# Patient Record
Sex: FEMALE | Race: OTHER | URBAN - NONMETROPOLITAN AREA
[De-identification: names, ages, dates, MRNs, and addresses within clinical notes are randomized per-mention and may not be internally consistent; named-entity substitution may affect disease eponyms.]

---

## 2021-02-03 NOTE — PATIENT DISCUSSION
"""S/P IOL OS: Tecnis ZCB00 19.5 (ORA) +ORA/Femto/Arcs +Omidria. Continue post operative instructions and drops per schedule.  """

## 2021-05-19 NOTE — PATIENT DISCUSSION
S/p CE/PCIOL. Patient responding well to the current drop therapy. Taper Prednisolone Acetate 2x a day for 1 week, then 1x  day for 1 week then STOP.

## 2021-06-07 NOTE — PATIENT DISCUSSION
Patient will call to schedule Biometry for OD. Had testing January 12, 2021, explained only good for 6 months. Patient understood that she would need to schedule for scans once decided if she would like to follow through with cataract surgery OD.

## 2021-10-21 NOTE — PATIENT DISCUSSION
Surgical Coordinator Notes: OD only, OCT Done today 10/21/2021. Dilate at Preop due to not being dilated over 6 months.

## 2021-11-17 NOTE — PATIENT DISCUSSION
CATARACT SURGERY PLANNER - STANDARD IOL/+FEMTO: Phacoemulsification with IOL: Eye: OD|DOS: 12/2/2021|Model: ZCBOO|Power: 20. 0|Target: PLANO|Femto: YES|Arcs: 46° @ 20° ; 46° @ 200°|Visc: DUET|Omidria: YES|10% Phenylephrine: YES|Epi-shugarcaine: YES|Phaco Setting: STD|BSS+: NO|Trypan Blue: NO|CTR: NO|Olive Tip: NO|Atropine: NO|Pupilloplasty: NO|Notes: PLAN: ZCBOO W/ FEMTO @ PLANO OD. HX: SECONDARY NONINFECTIOUS IRIDOCYLITIS S/P CE OS; SUBTLE RPE CHANGES OU; RARE MICRODRUSEN. DILATED PUPIL: NOT LISTED.

## 2023-04-10 ENCOUNTER — NEW PATIENT (OUTPATIENT)
Dept: URBAN - NONMETROPOLITAN AREA CLINIC 6 | Facility: CLINIC | Age: 58
End: 2023-04-10

## 2023-04-10 DIAGNOSIS — H25.13: ICD-10-CM

## 2023-04-10 PROCEDURE — 92004 COMPRE OPH EXAM NEW PT 1/>: CPT

## 2023-04-10 PROCEDURE — 92015 DETERMINE REFRACTIVE STATE: CPT

## 2023-04-10 ASSESSMENT — VISUAL ACUITY
OD_GLARE: 20/200
OD_SC: 20/60+1
OS_SC: 20/25-1
OS_GLARE: 20/25
OU_SC: 20/25-1

## 2023-04-10 ASSESSMENT — TONOMETRY
OS_IOP_MMHG: 14
OD_IOP_MMHG: 15

## 2023-04-10 ASSESSMENT — KERATOMETRY
OD_AXISANGLE2_DEGREES: 94
OS_AXISANGLE_DEGREES: 180
OD_K2POWER_DIOPTERS: 43.25
OD_AXISANGLE_DEGREES: 4
OS_K2POWER_DIOPTERS: 43.75
OS_AXISANGLE2_DEGREES: 90
OD_K1POWER_DIOPTERS: 42.00
OS_K1POWER_DIOPTERS: 42.50

## 2023-05-10 ENCOUNTER — PRE-OP/H&P (OUTPATIENT)
Dept: URBAN - NONMETROPOLITAN AREA CLINIC 6 | Facility: CLINIC | Age: 58
End: 2023-05-10

## 2023-05-10 DIAGNOSIS — H25.13: ICD-10-CM

## 2023-05-10 PROCEDURE — 92136 OPHTHALMIC BIOMETRY: CPT

## 2023-05-10 ASSESSMENT — KERATOMETRY
OS_K2POWER_DIOPTERS: 43.75
OD_K2POWER_DIOPTERS: 43.25
OS_K1POWER_DIOPTERS: 42.50
OS_AXISANGLE_DEGREES: 180
OD_K1POWER_DIOPTERS: 42.00
OD_AXISANGLE2_DEGREES: 94
OD_AXISANGLE_DEGREES: 4
OS_AXISANGLE2_DEGREES: 90

## 2023-05-31 PROBLEM — Z96.1: Noted: 2023-05-31

## 2023-06-01 ENCOUNTER — POST-OP (OUTPATIENT)
Dept: URBAN - NONMETROPOLITAN AREA CLINIC 6 | Facility: CLINIC | Age: 58
End: 2023-06-01

## 2023-06-01 DIAGNOSIS — Z96.1: ICD-10-CM

## 2023-06-01 PROCEDURE — 99024 POSTOP FOLLOW-UP VISIT: CPT

## 2023-06-01 ASSESSMENT — VISUAL ACUITY: OD_SC: 20/40

## 2023-06-01 ASSESSMENT — TONOMETRY: OD_IOP_MMHG: 8

## 2023-06-05 ENCOUNTER — POST-OP (OUTPATIENT)
Dept: URBAN - NONMETROPOLITAN AREA CLINIC 6 | Facility: CLINIC | Age: 58
End: 2023-06-05

## 2023-06-05 DIAGNOSIS — Z96.1: ICD-10-CM

## 2023-06-05 DIAGNOSIS — H25.12: ICD-10-CM

## 2023-06-05 DIAGNOSIS — H25.11: ICD-10-CM

## 2023-06-05 PROCEDURE — 92136 OPHTHALMIC BIOMETRY: CPT

## 2023-06-05 PROCEDURE — 99024 POSTOP FOLLOW-UP VISIT: CPT

## 2023-06-05 ASSESSMENT — KERATOMETRY
OD_K1POWER_DIOPTERS: 42.25
OD_K2POWER_DIOPTERS: 43.75
OD_AXISANGLE_DEGREES: 180
OD_AXISANGLE2_DEGREES: 90

## 2023-06-05 ASSESSMENT — TONOMETRY: OD_IOP_MMHG: 13

## 2023-06-05 ASSESSMENT — VISUAL ACUITY
OD_SC: J1+
OD_SC: 20/30-1

## 2023-10-24 ENCOUNTER — ESTABLISHED PATIENT (OUTPATIENT)
Dept: URBAN - NONMETROPOLITAN AREA CLINIC 6 | Facility: CLINIC | Age: 58
End: 2023-10-24

## 2023-10-24 DIAGNOSIS — H25.12: ICD-10-CM

## 2023-10-24 DIAGNOSIS — H25.11: ICD-10-CM

## 2023-10-24 DIAGNOSIS — Z96.1: ICD-10-CM

## 2023-10-24 PROCEDURE — 92012 INTRM OPH EXAM EST PATIENT: CPT

## 2023-10-24 ASSESSMENT — VISUAL ACUITY
OD_SC: 20/25-2
OU_SC: 20/25
OS_SC: 20/40-1

## 2023-10-24 ASSESSMENT — KERATOMETRY
OD_AXISANGLE2_DEGREES: 90
OD_K1POWER_DIOPTERS: 42.25
OD_K2POWER_DIOPTERS: 43.75
OD_AXISANGLE_DEGREES: 180

## 2023-10-24 ASSESSMENT — TONOMETRY
OD_IOP_MMHG: 13
OS_IOP_MMHG: 13

## 2023-11-03 ENCOUNTER — POST-OP (OUTPATIENT)
Dept: URBAN - NONMETROPOLITAN AREA CLINIC 6 | Facility: CLINIC | Age: 58
End: 2023-11-03

## 2023-11-03 DIAGNOSIS — Z96.1: ICD-10-CM

## 2023-11-03 PROCEDURE — 99024 POSTOP FOLLOW-UP VISIT: CPT

## 2023-11-03 ASSESSMENT — TONOMETRY
OS_IOP_MMHG: 18
OS_IOP_MMHG: 45
OS_IOP_MMHG: 30
OS_IOP_MMHG: 20
OS_IOP_MMHG: 39

## 2023-11-03 ASSESSMENT — VISUAL ACUITY: OS_SC: 20/60-2

## 2023-11-16 ENCOUNTER — POST-OP (OUTPATIENT)
Dept: URBAN - NONMETROPOLITAN AREA CLINIC 6 | Facility: CLINIC | Age: 58
End: 2023-11-16

## 2023-11-16 DIAGNOSIS — Z96.1: ICD-10-CM

## 2023-11-16 PROCEDURE — 99024 POSTOP FOLLOW-UP VISIT: CPT

## 2023-11-16 ASSESSMENT — TONOMETRY
OS_IOP_MMHG: 16
OD_IOP_MMHG: 13

## 2023-11-16 ASSESSMENT — KERATOMETRY
OD_K1POWER_DIOPTERS: 42.00
OS_AXISANGLE2_DEGREES: 96
OS_K2POWER_DIOPTERS: 44.00
OD_K2POWER_DIOPTERS: 43.25
OS_AXISANGLE_DEGREES: 6
OS_K1POWER_DIOPTERS: 42.75
OD_AXISANGLE2_DEGREES: 89
OD_AXISANGLE_DEGREES: 179

## 2023-11-16 ASSESSMENT — VISUAL ACUITY
OS_SC: 20/40
OU_SC: J1-1
OU_SC: 20/25
OD_SC: 20/25

## 2023-11-20 ENCOUNTER — POST-OP (OUTPATIENT)
Dept: URBAN - NONMETROPOLITAN AREA CLINIC 6 | Facility: CLINIC | Age: 58
End: 2023-11-20

## 2023-11-20 DIAGNOSIS — Z96.1: ICD-10-CM

## 2023-11-20 PROCEDURE — 99024 POSTOP FOLLOW-UP VISIT: CPT

## 2023-11-20 ASSESSMENT — VISUAL ACUITY
OU_SC: 20/25-1
OD_SC: 20/25-1
OS_SC: 20/30-1

## 2023-12-05 ENCOUNTER — POST-OP (OUTPATIENT)
Dept: URBAN - NONMETROPOLITAN AREA CLINIC 6 | Facility: CLINIC | Age: 58
End: 2023-12-05

## 2023-12-05 DIAGNOSIS — Z96.1: ICD-10-CM

## 2023-12-05 PROCEDURE — 99024 POSTOP FOLLOW-UP VISIT: CPT

## 2023-12-05 ASSESSMENT — TONOMETRY
OD_IOP_MMHG: 15
OS_IOP_MMHG: 11

## 2023-12-05 ASSESSMENT — VISUAL ACUITY
OD_GLARE: 20/60
OS_SC: 20/25
OD_SC: 20/25-1
OS_GLARE: 20/40
OU_SC: 20/20

## 2023-12-05 ASSESSMENT — KERATOMETRY
OS_AXISANGLE2_DEGREES: 90
OD_AXISANGLE2_DEGREES: 92
OS_AXISANGLE_DEGREES: 180
OS_K2POWER_DIOPTERS: 44.00
OD_K2POWER_DIOPTERS: 43.50
OS_K1POWER_DIOPTERS: 42.50
OD_K1POWER_DIOPTERS: 42.25
OD_AXISANGLE_DEGREES: 2